# Patient Record
Sex: FEMALE | Race: WHITE | NOT HISPANIC OR LATINO | Employment: UNEMPLOYED | URBAN - METROPOLITAN AREA
[De-identification: names, ages, dates, MRNs, and addresses within clinical notes are randomized per-mention and may not be internally consistent; named-entity substitution may affect disease eponyms.]

---

## 2023-04-19 LAB
ERYTHROCYTE DISTRIBUTION WIDTH (RATIO) BY AUTOMATED COUNT: 12.6 % (ref 11.5–14.5)
ERYTHROCYTE MEAN CORPUSCULAR HEMOGLOBIN CONCENTRATION (G/DL) BY AUTOMATED: 32.8 G/DL (ref 32–36)
ERYTHROCYTE MEAN CORPUSCULAR VOLUME (FL) BY AUTOMATED COUNT: 93 FL (ref 80–100)
ERYTHROCYTES (10*6/UL) IN BLOOD BY AUTOMATED COUNT: 3.2 X10E12/L (ref 4–5.2)
FERRITIN, PREGNANCY: 13 UG/L
FOLATE, SERUM, PREGNANCY: >24 NG/ML
GLUCOSE, 1 HR SCREEN, PREG: 112 MG/DL
HEMATOCRIT (%) IN BLOOD BY AUTOMATED COUNT: 29.6 % (ref 36–46)
HEMOGLOBIN (G/DL) IN BLOOD: 9.7 G/DL (ref 12–16)
IRON (UG/DL) IN SER/PLAS IN PREGNANCY: 47 UG/DL
IRON BINDING CAPACITY (UG/DL) IN PREGNANCY: 493 UG/DL
IRON SATURATION (%) IN PREGNANCY: 10 %
LEUKOCYTES (10*3/UL) IN BLOOD BY AUTOMATED COUNT: 7.1 X10E9/L (ref 4.4–11.3)
PLATELETS (10*3/UL) IN BLOOD AUTOMATED COUNT: 227 X10E9/L (ref 150–450)
REFLEX ADDED, ANEMIA PANEL: ABNORMAL
VITAMIN B12, PREGNANCY: 310 PG/ML

## 2023-05-03 LAB — URINE CULTURE: NORMAL

## 2023-05-15 LAB
ERYTHROCYTE DISTRIBUTION WIDTH (RATIO) BY AUTOMATED COUNT: 15.1 % (ref 11.5–14.5)
ERYTHROCYTE MEAN CORPUSCULAR HEMOGLOBIN CONCENTRATION (G/DL) BY AUTOMATED: 32.6 G/DL (ref 32–36)
ERYTHROCYTE MEAN CORPUSCULAR VOLUME (FL) BY AUTOMATED COUNT: 94 FL (ref 80–100)
ERYTHROCYTES (10*6/UL) IN BLOOD BY AUTOMATED COUNT: 3.57 X10E12/L (ref 4–5.2)
HEMATOCRIT (%) IN BLOOD BY AUTOMATED COUNT: 33.7 % (ref 36–46)
HEMOGLOBIN (G/DL) IN BLOOD: 11 G/DL (ref 12–16)
HEMOGLOBIN (PG) IN RETICULOCYTES: 35 PG (ref 28–38)
LEUKOCYTES (10*3/UL) IN BLOOD BY AUTOMATED COUNT: 7.6 X10E9/L (ref 4.4–11.3)
PLATELETS (10*3/UL) IN BLOOD AUTOMATED COUNT: 185 X10E9/L (ref 150–450)
RETICULOCYTES (10*3/UL) IN BLOOD: 0.1 X10E12/L (ref 0.02–0.08)
RETICULOCYTES/100 ERYTHROCYTES IN BLOOD BY AUTOMATED COUNT: 2.7 % (ref 0.5–2)

## 2023-05-16 LAB — FERRITIN (UG/LL) IN SER/PLAS: 40 UG/L (ref 8–150)

## 2023-06-18 LAB — GROUP B STREP SCREEN: NORMAL

## 2023-11-15 ENCOUNTER — TELEPHONE (OUTPATIENT)
Dept: PRIMARY CARE | Facility: CLINIC | Age: 23
End: 2023-11-15
Payer: COMMERCIAL

## 2023-11-15 NOTE — TELEPHONE ENCOUNTER
Pt has bad cough/congestion - would like a recommendation for over the counter medication that she can take while breast feeding. Please call and advise

## 2023-11-15 NOTE — TELEPHONE ENCOUNTER
Would do regular Mucinex (NOT mucinex D or DM).  May want to check with her OB as well to see if they recommend anything different.